# Patient Record
Sex: FEMALE | Race: WHITE
[De-identification: names, ages, dates, MRNs, and addresses within clinical notes are randomized per-mention and may not be internally consistent; named-entity substitution may affect disease eponyms.]

---

## 2018-06-01 ENCOUNTER — HOSPITAL ENCOUNTER (EMERGENCY)
Dept: HOSPITAL 25 - UCCORT | Age: 48
Discharge: HOME | End: 2018-06-01
Payer: COMMERCIAL

## 2018-06-01 VITALS — SYSTOLIC BLOOD PRESSURE: 152 MMHG | DIASTOLIC BLOOD PRESSURE: 86 MMHG

## 2018-06-01 DIAGNOSIS — H66.91: ICD-10-CM

## 2018-06-01 DIAGNOSIS — Z88.0: ICD-10-CM

## 2018-06-01 DIAGNOSIS — Z88.1: ICD-10-CM

## 2018-06-01 DIAGNOSIS — I10: ICD-10-CM

## 2018-06-01 DIAGNOSIS — Z83.3: ICD-10-CM

## 2018-06-01 DIAGNOSIS — Z86.19: ICD-10-CM

## 2018-06-01 DIAGNOSIS — G43.909: ICD-10-CM

## 2018-06-01 DIAGNOSIS — Z88.6: ICD-10-CM

## 2018-06-01 DIAGNOSIS — J02.9: Primary | ICD-10-CM

## 2018-06-01 DIAGNOSIS — J45.909: ICD-10-CM

## 2018-06-01 DIAGNOSIS — Z91.041: ICD-10-CM

## 2018-06-01 DIAGNOSIS — Z82.49: ICD-10-CM

## 2018-06-01 PROCEDURE — 99212 OFFICE O/P EST SF 10 MIN: CPT

## 2018-06-01 PROCEDURE — G0463 HOSPITAL OUTPT CLINIC VISIT: HCPCS

## 2018-06-01 PROCEDURE — 87651 STREP A DNA AMP PROBE: CPT

## 2018-06-01 NOTE — UC
Throat Pain/Nasal Jose G HPI





- HPI Summary


HPI Summary: 





48 y/o male presents to the urgent care c/o RT side sore throat and RT ear pain 

for the past 3 days.  Pain w/ swallowing is 8/10.  First day of symptoms she 

had body aches and cold sweats.  Pt denies fever, SOB, cough, URI, chest pain, 

abdominal pain, N/V/D. Pt has multiple ABX allergies. Pt has no taking anything 

to alleviate symptoms.








- History of Current Complaint


Stated Complaint: RIGHT EAR,SORE THROAT


Time Seen by Provider: 06/01/18 15:43


Hx Obtained From: Patient


Hx Last Menstrual Period: ~2014


Pregnant?: No


Onset/Duration: Gradual Onset, Lasting Days - 3 days, Still Present, Worse 

Since - yesterday


Severity: Moderate


Pain Intensity: 8


Pain Scale Used: 0-10 Numeric


Cough: None


Associated Signs & Symptoms: Positive: Dysphagia, Other - RT ear pain





- Epiglottits Risk Factors


Epiglottis Risk Factors: Negative





- Allergies/Home Medications


Allergies/Adverse Reactions: 


 Allergies











Allergy/AdvReac Type Severity Reaction Status Date / Time


 


MS Aspirin [Aspirin] Allergy  Swelling Verified 06/01/18 15:46





   Of  





   Face,Lips,&  





   Throat,  





   Difficulty  





   Breathing  


 


MS Cephalexin [From Keflex] Allergy  Difficulty Verified 06/01/18 15:46





   Breathing  


 


MS Ibuprofen [Ibuprofen] Allergy  Swelling Verified 06/01/18 15:46





   Of  





   Face,Lips,&  





   Throat,  





   Difficulty  





   Breathing  


 


MS Iodinated Diagnostic Allergy  Unknown Verified 06/01/18 15:46





Agents   Reaction  





[Iodinated Diagnostic Agents]   Details  


 


MS Latex [Latex] Allergy  Rash Verified 06/01/18 15:46


 


MS Penicillins [Penicillins] Allergy  Unknown Verified 06/01/18 15:46





   Reaction  





   Details  


 


sulfamethoxazole Allergy  Shortness Verified 06/01/18 15:47





[From Bactrim]   of Breath  


 


trimethoprim [From Bactrim] Allergy  Shortness Verified 06/01/18 15:47





   of Breath  











Home Medications: 


 Home Medications





Acetaminophen TAB* [Tylenol TAB*] 500 mg PO Q4H PRN 06/01/18 [History Confirmed 

06/01/18]


Buprenorphine/Naloxone SL TAB* [Suboxone 8-2 mg SL TAB*] 2 tab.sl SL DAILY 06/01 /18 [History Confirmed 06/01/18]











PMH/Surg Hx/FS Hx/Imm Hx


Previously Healthy: Yes


Cardiovascular History: Hypertension


Respiratory History: Asthma


Neurological History: Migraine


Other History Of: Hepatitis C





- Surgical History


Surgical History: Yes


Surgery Procedure, Year, and Place: Hysterectomy, 2014, [place]; C-Sections, 

2001 2004 2005; Cholecystectomy, 2001; Appendectomy, 1980





- Family History


Known Family History: Positive: Cardiac Disease, Hypertension, Diabetes





- Social History


Occupation: Employed Full-time


Lives: With Family


Alcohol Use: None


Substance Use Type: None


Smoking Status (MU): Never Smoked Tobacco





- Immunization History


Most Recent Influenza Vaccination: Not the 2015/2016 Season





Review of Systems


Constitutional: Chills, Fatigue, Other - body aches


Skin: Negative


Eyes: Negative


ENT: Sore Throat, Ear Ache - RT ear pain


Respiratory: Negative


Cardiovascular: Negative


Gastrointestinal: Negative


Genitourinary: Negative


Motor: Negative


Neurovascular: Negative


Musculoskeletal: Negative


Neurological: Negative


Psychological: Negative


Is Patient Immunocompromised?: No


All Other Systems Reviewed And Are Negative: Yes





Physical Exam





- Summary


Physical Exam Summary: 





VITAL SIGNS: Reviewed. 


GENERAL:  Patient is a well developed and nourished female  who is sitting 

comfortable in the examining table.  Patient is not in any acute respiratory 

distress. 


HEAD AND FACE: No signs of trauma.  No ecchymosis, hematomas or skull 

depressions. No sinus tenderness. 


EYES: PERRLA, EOMI x 2, No injected conjunctiva, no nystagmus. No photophobia.


EARS: Hearing grossly intact. RT ear canl clear, RT TM injected w/ erythema and 

yellowish discharge.  Left external ear canal clear, LF TM WNL. 


MOUTH: Positive pharynx with erythema, no exudates, palatal petechiae.  B/L 

tonsillar enlargement with no exudate. Uvula in midline. 


NECK: Supple, trachea is midline, Positive anterior cervical lymphadenopathy, 

no JVD, no carotid bruit, no c-spine tenderness, neck with full ROM. No 

meningeal signs, no Kernig's or brudzinskis signs. 


CHEST: Symmetric, no tenderness at palpation 


LUNGS: Clear to auscultation bilaterally. No wheezing or crackles.


CVS: Regular rate and rhythm, S1 and S2 present, no murmurs or gallops 

appreciated. 


ABDOMEN: Soft, non-tender. No signs of distention. No rebound no guarding, and 

no masses palpated. Bowel sounds are normal. 


EXTREMITIES: FROM in all major joints, no edema, no cyanosis or clubbing.


NEURO: Alert and oriented x 3. No acute neurological deficits. Speech is normal 

and follows commands. 


SKIN: Dry and warm 





Triage Information Reviewed: Yes





Throat Pain/Nasal Course/Dx





- Course


Course Of Treatment: 48 y/o male presents to the urgent care c/o RT side sore 

throat and RT ear pain for the past 3 days.  Pain w/ swallowing is 8/10.  First 

day of symptoms she had body aches and cold sweats.  Pt denies fever, SOB, cough

, URI, chest pain, abdominal pain, N/V/D. Pt has multiple ABX allergies. Pt has 

no taking anything to alleviate symptoms.Hx obtained. Pt w/ pharyngitis and RT 

Otitis media on examination.  Rapid strep ordered: negative. Pt w/ multiple ABx 

allergies. Pt Rx Z-luis enrique and Viscous lidocaine for her sore throat.Advised to 

take Tylenol PO to alleviates symptoms of pain and swelling. Advised on hand 

washing to avoid spreading. Pt advised to rest, eat well and avoid strenuous 

exercise. If symptoms do not improve or worsen advised to return to the urgent 

care or f/u with her PCP for further evaluation and treatment. Pt's BP is 

elevated today advised to decrease salt in diet, monitor BP and f/u with PCP 

for further management.Pt understood and agreed w/ plan of care.





- Differential Dx/Diagnosis


Differential Diagnosis/HQI/PQRI: Laryngitis, Mononucleosis, Otitis Media, 

Pharyngitis, Sinusitis, Tonsillitis, URI


Provider Diagnoses: 1- Pharyngitis.  2-Acute Rt otitis Media.  3- Uncontrolled 

HTN





Discharge





- Sign-Out/Discharge


Documenting (check all that apply): Discharge/Admit/Transfer - D/c home





- Discharge Plan


Condition: Stable


Disposition: HOME


Prescriptions: 


Azithromyxin LUIS ENRIQUE (NF) [Z-Luis Enrique (Zithromax) 250 mg tabs #6] 2 tab PO .TODAY, THEN 

1 DAILY #6 tab


Lidocaine 2% VISCOUS* [Xylocaine 2% Viscous*] 15 ml SWISH SPIT Q6H PRN #1 btl


 PRN Reason: Pain


Patient Education Materials:  Ear Infection (ED), Low-Sodium Diet (ED)


Referrals: 


Alexander Cobos MD [Primary Care Provider] - 3 Days


Additional Instructions: 


1- Please take the full course of the antibiotic to avoid resistance.


2-Please take Tyelenol PO  q6-8hrs prn as instructed after meals to alleviate 

pain and swelling. Increase fluid intake, eat well, rest and avoid strenuous 

exercise


3-If symptoms do not improve or worsen please return to the urgent care or f/u 

with your PCP for further evaluation and treatment.


4-Your BP is elevated today. please decrease salt in your diet, monitor BP and 

if it continues to be elevated please f/u with your PCP for further management














- Billing Disposition and Condition


Condition: STABLE


Disposition: Home